# Patient Record
(demographics unavailable — no encounter records)

---

## 2024-12-17 NOTE — QUALITY MEASURES
[MRI Brain] : MRI Brain: Yes [Microarray] : Microarray: Yes [Molecular testing for Fragile X] : Molecular testing for Fragile X: Yes [Labs for inborn error of metabolism] : Labs for inborn error of metabolism: Yes [Lead screening] : Lead screening: Yes [FreeTextEntry1] : Normal CBC lead with pediatrician

## 2024-12-17 NOTE — ASSESSMENT
[FreeTextEntry1] : Almost 3 year old boy with developmental delay.  Exam somewhat limited by poor cooperation but notable for hypotonia and unsteady gait.

## 2024-12-17 NOTE — DEVELOPMENTAL MILESTONES
[Kicks ball] : kicks ball [Says >20 words] : says >20 words [Washes and dries hands] : does not wash and dry hands [Brushes teeth with help] : does not brush teeth with help [Puts on clothing] : does not put  on clothing [Plays pretend] : does not play pretend [Plays with other children] : does not play  with other children [Imitates vertical line] : does not imitate vertical line [Turns pages of book 1 at a time] : does not turn pages of book 1 at a time [Throws ball overhead] : does not throw ball overhead [Jumps up] : does not jump up [Walks up and down stairs 1 step at a time] : does not walk up and down stairs 1 step at a time [Speech half understanable] : speech not half understandable [Body parts - 6] : no body parts - 6 [Combines words] : does not combine words [Follows 2 step command] : does not follow 2 step command

## 2024-12-17 NOTE — PLAN
[FreeTextEntry1] : Discussed indications and utility of testing including labs and imaging EI evaluation in progress Concern for autism- refer to Dr Reyes for autism evaluation F/u 3 months

## 2024-12-17 NOTE — HISTORY OF PRESENT ILLNESS
[FreeTextEntry1] : ANGELINA TREVIÑO is a 2 year old boy who presents for initial evaluation for developmental delay and walking difficulties.  He was referred by his pediatrician.  Here today with his parents.  He was born FT, via .  Monitored briefly in NICU after birth due to maternal fever, but no complications. Parents express concern about his gait.  He started walking at 14 months.  He has an unsteady gait and tends to trip or fall easily.  He is able to run and go up and down steps but often needs assistance.  He is not able to jump.   Developmentally, he started speaking at approximately 1 year.  Currently he knows ~50 words.  He can combine two words but is not speaking in sentences.  He knows some colors but not body parts.  His eye contact is poor, and he does not demonstrate shared interests or engage in imaginative play.  He is in process of EI evaluation and was approved for ST (PT evaluation pending).  Mom is concerned about autism but has not been able to meet with Developmental Pediatrics.  No dietary or behavioral concerns.  He sleeps well. He attends .  He can engage in parallel play with other children.  Mom is a family medicine physician in [place]

## 2024-12-17 NOTE — REASON FOR VISIT
[Initial Consultation] : an initial consultation for [Developmental Delay] : developmental delay [Parents] : parents [FreeTextEntry2] : unsteady gait

## 2024-12-17 NOTE — PHYSICAL EXAM
[Well-appearing] : well-appearing [Normocephalic] : normocephalic [No dysmorphic facial features] : no dysmorphic facial features [No ocular abnormalities] : no ocular abnormalities [Neck supple] : neck supple [No abnormal neurocutaneous stigmata or skin lesions] : no abnormal neurocutaneous stigmata or skin lesions [Straight] : straight [No alexander or dimples] : no alexander or dimples [No deformities] : no deformities [Alert] : alert [Pupils reactive to light] : pupils reactive to light [Turns to light] : turns to light [Tracks face, light or objects with full extraocular movements] : tracks face, light or objects with full extraocular movements [Responds to touch on face] : responds to touch on face [No facial asymmetry or weakness] : no facial asymmetry or weakness [No nystagmus] : no nystagmus [Responds to voice/sounds] : responds to voice/sounds [Good shoulder shrug] : good shoulder shrug [Midline tongue] : midline tongue [No fasciculations] : no fasciculations [Normal bulk] : normal bulk [No abnormal involuntary movements] : no abnormal involuntary movements [2+ biceps] : 2+ biceps [Triceps] : triceps [Knee jerks] : knee jerks [Ankle jerks] : ankle jerks [No ankle clonus] : no ankle clonus [Responds to touch and tickle] : responds to touch and tickle [No dysmetria in reaching for objects and or on FTNT] : no dysmetria in reaching for objects and or on FTNT [Reaches for toys and or gives high five] : reaches for toys and or gives high five [Stands alone] : stands alone [Good stoop and recover] : good stoop and recover [Able to do deep knee bend] : able to do deep knee bend [de-identified] : poor eye contact, irritable with exam [de-identified] : Low muscle tone- axial and appendicular; no truncal instability [de-identified] : symmetric limb movements, pushes legs with resistance [de-identified] : walks and runs down length of hallway, narrow based gait but clumsy

## 2024-12-17 NOTE — CONSULT LETTER
[Dear  ___] : Dear  [unfilled], [Consult Letter:] : I had the pleasure of evaluating your patient, [unfilled]. [Please see my note below.] : Please see my note below. [Consult Closing:] : Thank you very much for allowing me to participate in the care of this patient.  If you have any questions, please do not hesitate to contact me. [Sincerely,] : Sincerely, [FreeTextEntry3] : Galina Granado MD Child Neurologist [address], [address] [place] [address] Phone: [phone]